# Patient Record
(demographics unavailable — no encounter records)

---

## 2024-10-14 NOTE — HEALTH RISK ASSESSMENT
[Very Good] : ~his/her~  mood as very good [Yes] : Yes [Little interest or pleasure doing things] : 1) Little interest or pleasure doing things [Feeling down, depressed, or hopeless] : 2) Feeling down, depressed, or hopeless [0] : 2) Feeling down, depressed, or hopeless: Not at all (0) [PHQ-2 Negative - No further assessment needed] : PHQ-2 Negative - No further assessment needed [With Family] : lives with family [# of Members in Household ___] :  household currently consist of [unfilled] member(s) [Student] : student [High School] : high school [Single] : single [Feels Safe at Home] : Feels safe at home [Never] : Never [Monthly or less (1 pt)] : Monthly or less (1 point) [1 or 2 (0 pts)] : 1 or 2 (0 points) [Never (0 pts)] : Never (0 points) [Sexually Active] : sexually active [de-identified] : Rarely.  [de-identified] : Maintains active by doing to the gym five times a week.  [de-identified] : Maintains a healthy diet.  [[id number]] : 0 [PapSmearDate] : Never [de-identified] : Currently in college.

## 2024-10-14 NOTE — HISTORY OF PRESENT ILLNESS
[de-identified] : Ms. EDOUARD JAMES is a 21 year old female, who presents for initial evaluation and an annual physical exam. She is accompanied by her mom.  Pt states she was coming back from a concert recently when she experienced an episode of LOC. She notes she had not eaten or drank anything the whole day. and it was very hot Pt states she is feeling well at present. Denies any SOB, CP, abdominal pain, N/V/D, headache, dizziness, or leg swelling. On Junel every 20 days, so she does not get her period. Pt was started on Junel by her GYN because she was experiencing painful period cramps.

## 2024-10-14 NOTE — ASSESSMENT
[FreeTextEntry1] : Physical  Pt to schedule an lei't with her GYN for PAP  Low BP - pt reports dizziness when she stands up too fast increase daily water intake  salt intake in moderation  Blood work ordered. Follow up in one week for lab results.

## 2024-10-14 NOTE — ADDENDUM
[FreeTextEntry1] : Documented by Sandy Clifton acting as a scribe for Dr. Gina Cochran. 10/14/2024   All medical record entries made by the scribe were at my, Dr. Gina Cochran, direction and personally dictated by me on 10/14/2024. I have reviewed the chart and agree that the record accurately reflects my personal performance of the history, physical exam, assessment and plan. I have also personally directed, reviewed, and agreed with the chart.

## 2024-10-14 NOTE — HISTORY OF PRESENT ILLNESS
[de-identified] : Ms. EDOUARD JAMES is a 21 year old female, who presents for initial evaluation and an annual physical exam. She is accompanied by her mom.  Pt states she was coming back from a concert recently when she experienced an episode of LOC. She notes she had not eaten or drank anything the whole day. and it was very hot Pt states she is feeling well at present. Denies any SOB, CP, abdominal pain, N/V/D, headache, dizziness, or leg swelling. On Junel every 20 days, so she does not get her period. Pt was started on Junel by her GYN because she was experiencing painful period cramps.

## 2024-10-14 NOTE — HEALTH RISK ASSESSMENT
[Very Good] : ~his/her~  mood as very good [Yes] : Yes [Little interest or pleasure doing things] : 1) Little interest or pleasure doing things [Feeling down, depressed, or hopeless] : 2) Feeling down, depressed, or hopeless [0] : 2) Feeling down, depressed, or hopeless: Not at all (0) [PHQ-2 Negative - No further assessment needed] : PHQ-2 Negative - No further assessment needed [With Family] : lives with family [# of Members in Household ___] :  household currently consist of [unfilled] member(s) [Student] : student [High School] : high school [Single] : single [Feels Safe at Home] : Feels safe at home [Never] : Never [Monthly or less (1 pt)] : Monthly or less (1 point) [1 or 2 (0 pts)] : 1 or 2 (0 points) [Never (0 pts)] : Never (0 points) [Sexually Active] : sexually active [de-identified] : Rarely.  [de-identified] : Maintains active by doing to the gym five times a week.  [de-identified] : Maintains a healthy diet.  [[id number]] : 0 [PapSmearDate] : Never [de-identified] : Currently in college.

## 2025-02-05 NOTE — PHYSICAL EXAM
[Chaperone Present] : A chaperone was present in the examining room during all aspects of the physical examination [[id number]] : A chaperone was present during the pelvic exam. [Appropriately responsive] : appropriately responsive [Alert] : alert [No Acute Distress] : no acute distress [No Lymphadenopathy] : no lymphadenopathy [Soft] : soft [Non-tender] : non-tender [Non-distended] : non-distended [No HSM] : No HSM [No Lesions] : no lesions [No Mass] : no mass [Oriented x3] : oriented x3 [Examination Of The Breasts] : a normal appearance [No Masses] : no breast masses were palpable [Labia Majora] : normal [Labia Minora] : normal [Normal] : normal [Uterine Adnexae] : normal

## 2025-02-05 NOTE — PHYSICAL EXAM
[Chaperone Present] : A chaperone was present in the examining room during all aspects of the physical examination [[id number]] : A chaperone was present during the pelvic exam. [Appropriately responsive] : appropriately responsive [Alert] : alert [No Acute Distress] : no acute distress [No Lymphadenopathy] : no lymphadenopathy [Soft] : soft [Non-tender] : non-tender [Non-distended] : non-distended [No HSM] : No HSM [No Lesions] : no lesions [No Mass] : no mass [Oriented x3] : oriented x3 [Examination Of The Breasts] : a normal appearance [No Masses] : no breast masses were palpable [Labia Majora] : normal [Labia Minora] : normal [Normal] : normal [Uterine Adnexae] : normal

## 2025-02-07 NOTE — HISTORY OF PRESENT ILLNESS
[FreeTextEntry1] : 21 year F  presents for annual visit. Denies GYN complaints. Sexually active Declines STD testing  OCP: Junel 1/20 Fe  Denies interval changes in Med/Surg/Family Hx.  LMP 2023 (continuous OCPs)   Health Maintenance   Pap - Never Gardasil - Complete